# Patient Record
Sex: FEMALE | Race: BLACK OR AFRICAN AMERICAN | Employment: STUDENT | URBAN - METROPOLITAN AREA
[De-identification: names, ages, dates, MRNs, and addresses within clinical notes are randomized per-mention and may not be internally consistent; named-entity substitution may affect disease eponyms.]

---

## 2024-09-03 ENCOUNTER — APPOINTMENT (OUTPATIENT)
Dept: GENERAL RADIOLOGY | Facility: HOSPITAL | Age: 24
End: 2024-09-03
Attending: EMERGENCY MEDICINE
Payer: COMMERCIAL

## 2024-09-03 ENCOUNTER — HOSPITAL ENCOUNTER (EMERGENCY)
Facility: HOSPITAL | Age: 24
Discharge: HOME | End: 2024-09-03
Attending: EMERGENCY MEDICINE
Payer: COMMERCIAL

## 2024-09-03 VITALS
HEART RATE: 80 BPM | BODY MASS INDEX: 31.28 KG/M2 | HEIGHT: 62 IN | TEMPERATURE: 98 F | OXYGEN SATURATION: 99 % | WEIGHT: 170 LBS | RESPIRATION RATE: 18 BRPM | DIASTOLIC BLOOD PRESSURE: 67 MMHG | SYSTOLIC BLOOD PRESSURE: 113 MMHG

## 2024-09-03 DIAGNOSIS — G89.29 CHRONIC PAIN OF LEFT ANKLE: Primary | ICD-10-CM

## 2024-09-03 DIAGNOSIS — M25.572 CHRONIC PAIN OF LEFT ANKLE: Primary | ICD-10-CM

## 2024-09-03 PROCEDURE — 99282 EMERGENCY DEPT VISIT SF MDM: CPT

## 2024-09-03 PROCEDURE — 99283 EMERGENCY DEPT VISIT LOW MDM: CPT

## 2024-09-03 RX ORDER — NAPROXEN 500 MG/1
500 TABLET ORAL 2 TIMES DAILY PRN
Qty: 20 TABLET | Refills: 0 | Status: SHIPPED | OUTPATIENT
Start: 2024-09-03

## 2024-09-03 NOTE — ED INITIAL ASSESSMENT (HPI)
Pt arrives through triage with complaints of left ankle pain. Denies any trauma to her foot, reports waking up with pain

## 2024-09-03 NOTE — DISCHARGE INSTRUCTIONS
Take naproxen 500 mg every 12 hours for the next 2 to 3 days and then as needed for pain thereafter.    Ice your ankle to help with pain.    See podiatry orthopedic surgery if not improving.

## 2024-09-04 NOTE — ED PROVIDER NOTES
Patient Seen in: [facility] Emergency Department      History     Chief Complaint   Patient presents with    Ankle Pain     Stated Complaint: L ankle injury    Subjective:   HPI    24-year-old female presents for evaluation of left ankle pain.  Patient reports she developed left ankle pain 1.5 years ago after sleeping funny on the couch.  Reports she was seen at a hospital in [place], had an x-ray that was normal and discharged with outpatient follow-up.  Patient reports she saw someone for follow-up who advised her to support and was told to keep doing what she is doing.  She has had no recent injuries, no focal weakness or numbness.  Pain in her left ankle is worse after working or walking.  She has not taken anything for pain.    Objective:   Past Medical History:    Asthma (HCC)              History reviewed. No pertinent surgical history.             Social History     Socioeconomic History    Marital status: Single   Tobacco Use    Smoking status: Never    Smokeless tobacco: Never   Vaping Use    Vaping status: Never Used   Substance and Sexual Activity    Alcohol use: Yes     Comment: socailly    Drug use: Yes     Types: Cannabis     Comment: socially              Review of Systems    Positive for stated Chief Complaint: Ankle Pain    Other systems are as noted in HPI.  Constitutional and vital signs reviewed.      All other systems reviewed and negative except as noted above.    Physical Exam     ED Triage Vitals [09/03/24 1819]   /67   Pulse 80   Resp 18   Temp 98.4 °F (36.9 °C)   Temp src Temporal   SpO2 99 %   O2 Device None (Room air)       Current Vitals:   Vital Signs  BP: 113/67  Pulse: 80  Resp: 18  Temp: 98.4 °F (36.9 °C)  Temp src: Temporal  MAP (mmHg): 83    Oxygen Therapy  SpO2: 99 %  O2 Device: None (Room air)            Physical Exam  Vitals and nursing note reviewed.   Constitutional:       General: She is not in acute distress.     Appearance: Normal appearance. She is not  ill-appearing or toxic-appearing.   HENT:      Head: Normocephalic and atraumatic.   Eyes:      Conjunctiva/sclera: Conjunctivae normal.   Cardiovascular:      Rate and Rhythm: Normal rate and regular rhythm.      Pulses:           Dorsalis pedis pulses are 2+ on the right side and 2+ on the left side.   Pulmonary:      Effort: Pulmonary effort is normal. No respiratory distress.   Musculoskeletal:         General: No swelling, tenderness, deformity or signs of injury. Normal range of motion.      Cervical back: Normal range of motion. No rigidity.      Comments: Normal range of motion, no tenderness palpation bilateral malleoli   Neurological:      General: No focal deficit present.      Mental Status: She is alert.      Sensory: No sensory deficit.      Motor: No weakness.      Comments: Normal strength and sensation of bilateral lower extremities   Psychiatric:         Mood and Affect: Mood normal.               ED Course   Labs Reviewed - No data to display                   MDM                                         Medical Decision Making  Discussed with patient low likelihood fracture, sprain, DVT, infection.  Advise supportive care including NSAIDs as needed and outpatient follow-up.  Patient verbalizes understanding of and agreement with this plan.    Problems Addressed:  Chronic pain of left ankle: chronic illness or injury    Amount and/or Complexity of Data Reviewed  Radiology:      Details: X-rays considered, however patient has had atraumatic pain, low suspicion for bony injury    Risk  Prescription drug management.        Disposition and Plan     Clinical Impression:  1. Chronic pain of left ankle         Disposition:  Discharge  9/3/2024  6:50 pm    Follow-up:  Marco Bryant DPM  [address]  [address]  [facility] [address] [phone]    Follow up  As needed    Dewey Cloud MD  [address]  [facility] [address] [phone]    Follow up  As needed          Medications  Prescribed:  Discharge Medication List as of 9/3/2024  6:51 PM        START taking these medications    Details   naproxen 500 MG Oral Tab Take 1 tablet (500 mg total) by mouth 2 (two) times daily as needed., Normal, Disp-20 tablet, R-0

## 2025-01-06 ENCOUNTER — TELEPHONE (OUTPATIENT)
Dept: SURGERY | Facility: CLINIC | Age: 25
End: 2025-01-06

## 2025-01-08 ENCOUNTER — TELEPHONE (OUTPATIENT)
Dept: SURGERY | Facility: CLINIC | Age: 25
End: 2025-01-08